# Patient Record
(demographics unavailable — no encounter records)

---

## 2025-03-26 NOTE — PROCEDURE
[de-identified] : Reason for laryngoscopy: Oral exam unable to account for symptoms.   Flexible scope #2 used. Passed through nasal passage and nasopharynx/oropharynx/hypopharynx clear. Supraglottis normal. Glottis with fully mobile vocal cords without lesions or masses. Visualized subglottis clear. Postcricoid area without erythema or edema. No pooling of secretions.

## 2025-03-26 NOTE — HISTORY OF PRESENT ILLNESS
[de-identified] : 67y/o female w/ hx of seasonal allergies who came in chronic dry cough for many years that staretd after covid. She feels she has something in her throat in the morning that is causing her to cough. She feels her voice is hoarse at times. She is very sensitive to smells that causes her to cough. She has a chronic PND, and she uses nasal sinus rinses and allergy nasal spray that helps her. She had eliud x-rays that were normal. She has a hx of GERD and bas been on Nexium for 25 years. She had an US of the thyroid last year that showed an enlarged left thyroid nodule.  Pt denies any throat pain or dysphagia to solids or liquids

## 2025-03-26 NOTE — END OF VISIT
[FreeTextEntry3] : I personally saw and examined Ms. STEFANIE ALLEN in detail this visit today. I personally reviewed the HPI, PMH, FH, SH, ROS and medications/allergies. I have spoken to CHASE Ramsay regarding the history and have personally determined the assessment and plan of care, and documented this myself. I was present and participated in all key portions of the encounter and all procedures noted above. I have made changes in the body of the note where appropriate.   Attesting Faculty: See Attending Signature Below

## 2025-03-26 NOTE — CONSULT LETTER
[Dear  ___] : Dear  [unfilled], [Consult Letter:] : I had the pleasure of evaluating your patient, [unfilled]. [Please see my note below.] : Please see my note below. [Consult Closing:] : Thank you very much for allowing me to participate in the care of this patient.  If you have any questions, please do not hesitate to contact me. [Sincerely,] : Sincerely, [FreeTextEntry3] : Debbie Elias MD Otolaryngology and Cranial Base Surgery  Attending Physician- Department of Otolaryngology and Head & Neck Surgery  [facility] [facility] Office: [phone]  Fax: [phone]

## 2025-03-26 NOTE — ASSESSMENT
[FreeTextEntry1] : 67y/o female w/ hx of hiatal hernia, seasonal allergies, PND and GERD on Nexium who came in chronic dry cough that has been going on for many years  Laryngoscopy shows very mild reflux changes otherwise no significant findings  -Acid reflux handout given for lifestyle modifications, recommend weight loss, avoid eating 3 hours before bedtime and elevate HOB along with dietary modifications -Continue reflux medication however discussed that while PPI decrease the acidity they do not stop the act of reflux and even nonacidic reflux can be irritating to the throat -F/u with GI  -if she is interested can f/u with Dr. [facility] to consider medications/injections to reduce cough